# Patient Record
Sex: MALE | Race: WHITE | NOT HISPANIC OR LATINO | Employment: UNEMPLOYED | URBAN - NONMETROPOLITAN AREA
[De-identification: names, ages, dates, MRNs, and addresses within clinical notes are randomized per-mention and may not be internally consistent; named-entity substitution may affect disease eponyms.]

---

## 2017-05-01 ENCOUNTER — OFFICE VISIT (OUTPATIENT)
Dept: CARDIOLOGY | Facility: CLINIC | Age: 62
End: 2017-05-01

## 2017-05-01 VITALS
BODY MASS INDEX: 17.14 KG/M2 | OXYGEN SATURATION: 95 % | HEART RATE: 98 BPM | HEIGHT: 64 IN | SYSTOLIC BLOOD PRESSURE: 124 MMHG | WEIGHT: 100.4 LBS | DIASTOLIC BLOOD PRESSURE: 74 MMHG

## 2017-05-01 DIAGNOSIS — R55 VASOVAGAL SYNCOPE: Primary | ICD-10-CM

## 2017-05-01 DIAGNOSIS — R00.2 PALPITATIONS: ICD-10-CM

## 2017-05-01 DIAGNOSIS — R07.2 PRECORDIAL PAIN: ICD-10-CM

## 2017-05-01 PROBLEM — M13.0 HYPERTROPHIC POLYARTHRITIS: Status: ACTIVE | Noted: 2017-05-01

## 2017-05-01 PROBLEM — K21.9 ACID REFLUX: Status: ACTIVE | Noted: 2017-05-01

## 2017-05-01 PROCEDURE — 99203 OFFICE O/P NEW LOW 30 MIN: CPT | Performed by: PHYSICIAN ASSISTANT

## 2017-05-01 RX ORDER — BENZONATATE 100 MG/1
CAPSULE ORAL
Refills: 0 | COMMUNITY
Start: 2017-04-25

## 2017-05-01 RX ORDER — ALBUTEROL SULFATE 90 UG/1
AEROSOL, METERED RESPIRATORY (INHALATION)
Refills: 2 | COMMUNITY
Start: 2017-03-09

## 2017-05-01 RX ORDER — MONTELUKAST SODIUM 10 MG/1
TABLET ORAL NIGHTLY
Refills: 2 | COMMUNITY
Start: 2017-03-09

## 2017-05-01 RX ORDER — OMEPRAZOLE 20 MG/1
20 CAPSULE, DELAYED RELEASE ORAL 2 TIMES DAILY
COMMUNITY
End: 2018-05-07 | Stop reason: SDUPTHER

## 2017-05-01 RX ORDER — GABAPENTIN 300 MG/1
CAPSULE ORAL 3 TIMES DAILY
Refills: 2 | COMMUNITY
Start: 2017-03-09

## 2017-05-01 RX ORDER — CETIRIZINE HYDROCHLORIDE 10 MG/1
TABLET ORAL DAILY
Refills: 2 | COMMUNITY
Start: 2017-03-09

## 2017-05-01 RX ORDER — HYDROCHLOROTHIAZIDE 25 MG/1
TABLET ORAL DAILY
Refills: 2 | COMMUNITY
Start: 2017-03-09

## 2017-05-01 RX ORDER — HYDROXYZINE HYDROCHLORIDE 25 MG/1
TABLET, FILM COATED ORAL DAILY
Refills: 0 | COMMUNITY
Start: 2017-04-25

## 2017-05-01 RX ORDER — LOSARTAN POTASSIUM 100 MG/1
TABLET ORAL DAILY
Refills: 2 | COMMUNITY
Start: 2017-03-09

## 2017-05-01 RX ORDER — AMLODIPINE BESYLATE 10 MG/1
TABLET ORAL DAILY
Refills: 2 | COMMUNITY
Start: 2017-03-09

## 2017-05-01 RX ORDER — RANITIDINE 150 MG/1
TABLET ORAL DAILY
Refills: 0 | Status: ON HOLD | COMMUNITY
Start: 2017-04-25 | End: 2018-04-13 | Stop reason: ALTCHOICE

## 2017-05-01 RX ORDER — ALBUTEROL SULFATE 2.5 MG/3ML
SOLUTION RESPIRATORY (INHALATION)
Refills: 0 | COMMUNITY
Start: 2017-04-25

## 2017-05-01 RX ORDER — ACETAMINOPHEN 500 MG/1
TABLET, FILM COATED ORAL
Refills: 0 | COMMUNITY
Start: 2017-03-28

## 2017-05-01 RX ORDER — POTASSIUM CHLORIDE 20 MEQ/1
TABLET, EXTENDED RELEASE ORAL
Refills: 0 | COMMUNITY
Start: 2017-03-09

## 2017-05-01 RX ORDER — FLUTICASONE PROPIONATE 50 MCG
SPRAY, SUSPENSION (ML) NASAL DAILY
Refills: 2 | COMMUNITY
Start: 2017-03-09

## 2017-05-01 RX ORDER — MIRTAZAPINE 15 MG/1
TABLET, FILM COATED ORAL NIGHTLY
Refills: 2 | COMMUNITY
Start: 2017-03-09

## 2017-05-01 RX ORDER — LINACLOTIDE 290 UG/1
CAPSULE, GELATIN COATED ORAL DAILY
Refills: 0 | COMMUNITY
Start: 2017-04-25

## 2017-05-01 RX ORDER — DM/PE/ACETAMINOPHEN/CHLORPHENR 10-5-325-2
TABLET, SEQUENTIAL ORAL DAILY
Refills: 2 | COMMUNITY
Start: 2017-03-09

## 2017-05-01 RX ORDER — CLONIDINE HYDROCHLORIDE 0.3 MG/1
TABLET ORAL DAILY
Refills: 0 | COMMUNITY
Start: 2017-04-25

## 2017-05-01 RX ORDER — METOPROLOL SUCCINATE 50 MG/1
TABLET, EXTENDED RELEASE ORAL DAILY
Refills: 2 | COMMUNITY
Start: 2017-03-09

## 2017-05-01 RX ORDER — MECLIZINE HYDROCHLORIDE 25 MG/1
TABLET ORAL 3 TIMES DAILY
Refills: 2 | COMMUNITY
Start: 2017-03-09

## 2017-05-01 RX ORDER — NAPROXEN 500 MG/1
TABLET ORAL
Refills: 2 | COMMUNITY
Start: 2017-03-09

## 2017-05-01 RX ORDER — BUSPIRONE HYDROCHLORIDE 5 MG/1
TABLET ORAL 3 TIMES DAILY
Refills: 2 | COMMUNITY
Start: 2017-03-09

## 2017-05-01 RX ORDER — DILTIAZEM HYDROCHLORIDE 60 MG/1
2 TABLET, FILM COATED ORAL 2 TIMES DAILY
Refills: 2 | COMMUNITY
Start: 2017-03-09

## 2018-03-09 ENCOUNTER — OFFICE VISIT (OUTPATIENT)
Dept: GASTROENTEROLOGY | Facility: CLINIC | Age: 63
End: 2018-03-09

## 2018-03-09 VITALS
WEIGHT: 107.4 LBS | HEIGHT: 64 IN | OXYGEN SATURATION: 98 % | BODY MASS INDEX: 18.34 KG/M2 | HEART RATE: 97 BPM | SYSTOLIC BLOOD PRESSURE: 185 MMHG | DIASTOLIC BLOOD PRESSURE: 102 MMHG

## 2018-03-09 DIAGNOSIS — Z86.010 HISTORY OF COLON POLYPS: ICD-10-CM

## 2018-03-09 DIAGNOSIS — R11.2 NAUSEA AND VOMITING, INTRACTABILITY OF VOMITING NOT SPECIFIED, UNSPECIFIED VOMITING TYPE: ICD-10-CM

## 2018-03-09 DIAGNOSIS — R10.84 GENERALIZED ABDOMINAL PAIN: Primary | ICD-10-CM

## 2018-03-09 DIAGNOSIS — K59.00 CONSTIPATION, UNSPECIFIED CONSTIPATION TYPE: ICD-10-CM

## 2018-03-09 PROCEDURE — 99244 OFF/OP CNSLTJ NEW/EST MOD 40: CPT | Performed by: PHYSICIAN ASSISTANT

## 2018-03-09 RX ORDER — AMITRIPTYLINE HYDROCHLORIDE 10 MG/1
10 TABLET, FILM COATED ORAL NIGHTLY
Status: ON HOLD | COMMUNITY
End: 2018-04-13 | Stop reason: ALTCHOICE

## 2018-03-09 RX ORDER — SUCRALFATE 1 G/1
1 TABLET ORAL 4 TIMES DAILY
COMMUNITY
End: 2018-05-07

## 2018-03-09 RX ORDER — CYPROHEPTADINE HYDROCHLORIDE 4 MG/1
4 TABLET ORAL 3 TIMES DAILY PRN
COMMUNITY

## 2018-03-09 NOTE — PROGRESS NOTES
Chief Complaint   Patient presents with   • Nausea   • Vomiting   • Bloated   • Abdominal Pain     Terell Rojas is a 62 y.o. male who presents to the office today at the request of JEFFY Everett for Nausea; Vomiting; Bloated; and Abdominal Pain.    HPI    The patient was seen for a GI evaluation due to nausea, vomiting, abdominal bloating and pain that has been occurring for the past year.  Patient has come to the point of not eating much.  He has chronic constipation and takes Linzess 290 mcg daily but it does not alleviate his constipation.  He had an EGD/colonoscopy 2-3 years ago by Dr. Rodrigues.  Polyps were removed, but the entire colon could not be visualized due to inadequate bowel prep.  His weight waxes and wanes.  Patient takes daily Neurontin.  He explains that he was told ten years ago by Dr. Vega that he has cirrhosis of the liver but he is unsure.  Patient reports that he drinks beer almost daily.  Medical, surgical, social and family histories were reviewed and are listed below.      Review of Systems   Constitutional: Positive for appetite change and fatigue. Negative for chills and fever.   HENT: Negative for trouble swallowing.    Eyes: Negative for visual disturbance.   Respiratory: Positive for shortness of breath.    Cardiovascular: Positive for chest pain.   Gastrointestinal: Positive for abdominal distention, abdominal pain, constipation, nausea and vomiting. Negative for anal bleeding, blood in stool, diarrhea and rectal pain.   Genitourinary: Negative for difficulty urinating.   Neurological: Positive for dizziness, syncope, light-headedness and headaches.   Hematological: Bruises/bleeds easily.   Psychiatric/Behavioral: Positive for sleep disturbance. The patient is nervous/anxious.        ACTIVE PROBLEMS:   Specialty Problems        Gastroenterology Problems    Acid reflux              PAST MEDICAL HISTORY:  Past Medical History:   Diagnosis Date   • COPD (chronic obstructive  pulmonary disease) 11/15/2016   • Hypertension 11/15/2016   • Narrow complex tachycardia 11/15/2016    Likely SVT   • Syncope    • Tobacco use     Chronic       SURGICAL HISTORY:  Past Surgical History:   Procedure Laterality Date   • CARDIAC CATHETERIZATION  2008    Normal coronaries   • ENDOSCOPY AND COLONOSCOPY     • TOTAL HIP ARTHROPLASTY REVISION         FAMILY HISTORY:  Family History   Problem Relation Age of Onset   • Coronary artery disease Other    • Diabetes Other    • Heart disease Other    • Hypertension Other    • Liver cancer Mother        SOCIAL HISTORY:  Social History   Substance Use Topics   • Smoking status: Current Every Day Smoker     Types: Cigarettes   • Smokeless tobacco: Never Used   • Alcohol use Yes      Comment: beer       CURRENT MEDICATION:    Current Outpatient Prescriptions:   •  amitriptyline (ELAVIL) 10 MG tablet, Take 10 mg by mouth Every Night., Disp: , Rfl:   •  amLODIPine (NORVASC) 10 MG tablet, Daily., Disp: , Rfl: 2  •  benzonatate (TESSALON) 100 MG capsule, prn, Disp: , Rfl: 0  •  busPIRone (BUSPAR) 5 MG tablet, 3 (Three) Times a Day., Disp: , Rfl: 2  •  cetirizine (zyrTEC) 10 MG tablet, Daily., Disp: , Rfl: 2  •  CloNIDine (CATAPRES) 0.3 MG tablet, Daily., Disp: , Rfl: 0  •  cyproheptadine (PERIACTIN) 4 MG tablet, Take 4 mg by mouth 3 (Three) Times a Day As Needed for Allergies., Disp: , Rfl:   •  fluticasone (FLONASE) 50 MCG/ACT nasal spray, Daily., Disp: , Rfl: 2  •  gabapentin (NEURONTIN) 300 MG capsule, 3 (Three) Times a Day., Disp: , Rfl: 2  •  GNP VITAMIN D3 EXTRA STRENGTH 1000 UNITS tablet, Daily., Disp: , Rfl: 2  •  hydrochlorothiazide (HYDRODIURIL) 25 MG tablet, Daily., Disp: , Rfl: 2  •  LINZESS 290 MCG capsule capsule, Daily., Disp: , Rfl: 0  •  losartan (COZAAR) 100 MG tablet, Daily., Disp: , Rfl: 2  •  meclizine (ANTIVERT) 25 MG tablet, 3 (Three) Times a Day., Disp: , Rfl: 2  •  metoprolol succinate XL (TOPROL-XL) 50 MG 24 hr tablet, Daily., Disp: , Rfl: 2  •   "mirtazapine (REMERON) 15 MG tablet, Every Night., Disp: , Rfl: 2  •  montelukast (SINGULAIR) 10 MG tablet, Every Night., Disp: , Rfl: 2  •  naproxen (NAPROSYN) 500 MG tablet, prn, Disp: , Rfl: 2  •  omeprazole (priLOSEC) 20 MG capsule, Take 20 mg by mouth 2 (Two) Times a Day., Disp: , Rfl:   •  raNITIdine (ZANTAC) 150 MG tablet, Daily., Disp: , Rfl: 0  •  sucralfate (CARAFATE) 1 g tablet, Take 1 g by mouth 4 (Four) Times a Day., Disp: , Rfl:   •  VENTOLIN  (90 BASE) MCG/ACT inhaler, prn, Disp: , Rfl: 2  •  albuterol (PROVENTIL) (2.5 MG/3ML) 0.083% nebulizer solution, prn, Disp: , Rfl: 0  •  hydrOXYzine (ATARAX) 25 MG tablet, Daily., Disp: , Rfl: 0  •  PAIN RELIEF EXTRA STRENGTH 500 MG tablet, prn, Disp: , Rfl: 0  •  polyethylene glycol (GoLYTELY) 236 g solution, Starting at 6 pm on day prior to procedure, drink 8 ounces every 15 minutes until all gone or stools are clear. May add flavor packet., Disp: 4000 mL, Rfl: 0  •  potassium chloride (K-DUR,KLOR-CON) 20 MEQ CR tablet, Takes 1 daily, d/c after james., Disp: , Rfl: 0  •  SYMBICORT 80-4.5 MCG/ACT inhaler, 2 puffs 2 (Two) Times a Day., Disp: , Rfl: 2    ALLERGIES:  Review of patient's allergies indicates no known allergies.    VISIT VITALS:  BP (!) 185/102 (BP Location: Right arm, Patient Position: Sitting, Cuff Size: Adult) Comment: manual cuff  Pulse 97  Ht 162.6 cm (64\")  Wt 48.7 kg (107 lb 6.4 oz)  SpO2 98%  BMI 18.44 kg/m2    PHYSICAL EXAMINATION:  Physical Exam   Constitutional: He is oriented to person, place, and time. He appears well-developed and well-nourished. No distress.   HENT:   Head: Normocephalic and atraumatic.   Right Ear: External ear normal.   Left Ear: External ear normal.   Nose: Nose normal.   Mouth/Throat: Oropharynx is clear and moist. No oropharyngeal exudate.   Eyes: Conjunctivae and EOM are normal. Pupils are equal, round, and reactive to light. Right eye exhibits no discharge. Left eye exhibits no discharge. No scleral " icterus.   Neck: Normal range of motion. Neck supple. No JVD present. No tracheal deviation present. No thyromegaly present.   Cardiovascular: Normal rate, regular rhythm, normal heart sounds and intact distal pulses.  Exam reveals no gallop and no friction rub.    No murmur heard.  Pulmonary/Chest: Effort normal and breath sounds normal. No stridor. No respiratory distress. He has no wheezes. He has no rales. He exhibits no tenderness.   Abdominal: Soft. Bowel sounds are normal. He exhibits distension. He exhibits no mass. There is tenderness (diffuse). There is no rebound and no guarding. No hernia.   Musculoskeletal: He exhibits no edema, tenderness or deformity.   Lymphadenopathy:     He has no cervical adenopathy.   Neurological: He is alert and oriented to person, place, and time. He has normal reflexes. He displays normal reflexes. No cranial nerve deficit. He exhibits normal muscle tone. Coordination normal.   Skin: Skin is warm and dry. No rash noted. He is not diaphoretic. No erythema. No pallor.   Psychiatric: He has a normal mood and affect. His behavior is normal. Judgment and thought content normal.       Assessment/Plan      Diagnosis Plan   1. Generalized abdominal pain  CT Abdomen Pelvis With & Without Contrast    Case Request   2. Nausea and vomiting, intractability of vomiting not specified, unspecified vomiting type  CT Abdomen Pelvis With & Without Contrast    Case Request   3. Constipation, unspecified constipation type  CT Abdomen Pelvis With & Without Contrast    Case Request   4. History of colon polyps  Case Request     The patient will be scheduled for an EGD/colonoscopy and a CT scan of his abdomen and pelvis.  Patient's abdominal tenderness is significant.  He will be given samples of Relistor 450mg to take daily in place of Linzess 290 mcg to see if his constipation can be alleviated.  His blood pressure was elevated today.  He states that his bottom number is always elevated and he  takes his medication daily.  I called and left a message with the office statff at [facility] regarding my concerns about his uncontrolled BP.  Patient voiced understanding and agreement of recommendations.   Return in about 8 weeks (around 5/4/2018) for Recheck.      I advised the patient of the risks in continuing to use tobacco, and I provided this patient with smoking cessation educational materials.    During this visit, I spent < 3 minutes counseling the patient regarding smoking cessation.        JAMIL Moulton

## 2018-03-28 ENCOUNTER — HOSPITAL ENCOUNTER (OUTPATIENT)
Dept: CT IMAGING | Facility: HOSPITAL | Age: 63
Discharge: HOME | End: 2018-03-28
Admitting: PHYSICIAN ASSISTANT

## 2018-03-28 DIAGNOSIS — R10.84 GENERALIZED ABDOMINAL PAIN: ICD-10-CM

## 2018-03-28 DIAGNOSIS — K59.00 CONSTIPATION, UNSPECIFIED CONSTIPATION TYPE: ICD-10-CM

## 2018-03-28 DIAGNOSIS — R11.2 NAUSEA AND VOMITING, INTRACTABILITY OF VOMITING NOT SPECIFIED, UNSPECIFIED VOMITING TYPE: ICD-10-CM

## 2018-03-28 LAB — CREAT BLDA-MCNC: 0.7 MG/DL (ref 0.6–1.3)

## 2018-03-28 PROCEDURE — 82565 ASSAY OF CREATININE: CPT

## 2018-03-28 PROCEDURE — 25010000002 IOPAMIDOL 61 % SOLUTION: Performed by: PHYSICIAN ASSISTANT

## 2018-03-28 PROCEDURE — 74178 CT ABD&PLV WO CNTR FLWD CNTR: CPT | Performed by: RADIOLOGY

## 2018-03-28 PROCEDURE — 74178 CT ABD&PLV WO CNTR FLWD CNTR: CPT

## 2018-03-28 RX ADMIN — IOPAMIDOL 100 ML: 612 INJECTION, SOLUTION INTRAVENOUS at 10:45

## 2018-03-29 ENCOUNTER — TELEPHONE (OUTPATIENT)
Dept: GASTROENTEROLOGY | Facility: CLINIC | Age: 63
End: 2018-03-29

## 2018-04-13 ENCOUNTER — HOSPITAL ENCOUNTER (EMERGENCY)
Facility: HOSPITAL | Age: 63
Discharge: HOME | End: 2018-04-13
Attending: EMERGENCY MEDICINE | Admitting: EMERGENCY MEDICINE

## 2018-04-13 ENCOUNTER — ANESTHESIA EVENT (OUTPATIENT)
Dept: PERIOP | Facility: HOSPITAL | Age: 63
End: 2018-04-13

## 2018-04-13 ENCOUNTER — ANESTHESIA (OUTPATIENT)
Dept: PERIOP | Facility: HOSPITAL | Age: 63
End: 2018-04-13

## 2018-04-13 ENCOUNTER — HOSPITAL ENCOUNTER (OUTPATIENT)
Facility: HOSPITAL | Age: 63
Setting detail: HOSPITAL OUTPATIENT SURGERY
Discharge: HOME | End: 2018-04-13
Attending: INTERNAL MEDICINE | Admitting: INTERNAL MEDICINE

## 2018-04-13 VITALS
HEART RATE: 98 BPM | WEIGHT: 105 LBS | RESPIRATION RATE: 20 BRPM | TEMPERATURE: 98.6 F | OXYGEN SATURATION: 95 % | HEIGHT: 64 IN | SYSTOLIC BLOOD PRESSURE: 100 MMHG | BODY MASS INDEX: 17.93 KG/M2 | DIASTOLIC BLOOD PRESSURE: 58 MMHG

## 2018-04-13 VITALS
DIASTOLIC BLOOD PRESSURE: 91 MMHG | RESPIRATION RATE: 20 BRPM | HEART RATE: 89 BPM | HEIGHT: 63 IN | SYSTOLIC BLOOD PRESSURE: 147 MMHG | WEIGHT: 104 LBS | BODY MASS INDEX: 18.43 KG/M2 | OXYGEN SATURATION: 97 % | TEMPERATURE: 98.2 F

## 2018-04-13 DIAGNOSIS — K62.5 RECTAL BLEEDING: Primary | ICD-10-CM

## 2018-04-13 DIAGNOSIS — R10.84 GENERALIZED ABDOMINAL PAIN: ICD-10-CM

## 2018-04-13 DIAGNOSIS — Z86.010 HISTORY OF COLONOSCOPY WITH POLYPECTOMY: ICD-10-CM

## 2018-04-13 DIAGNOSIS — Z86.010 HISTORY OF COLON POLYPS: ICD-10-CM

## 2018-04-13 DIAGNOSIS — K59.00 CONSTIPATION, UNSPECIFIED CONSTIPATION TYPE: ICD-10-CM

## 2018-04-13 DIAGNOSIS — Z98.890 HISTORY OF COLONOSCOPY WITH POLYPECTOMY: ICD-10-CM

## 2018-04-13 DIAGNOSIS — R11.2 NAUSEA AND VOMITING, INTRACTABILITY OF VOMITING NOT SPECIFIED, UNSPECIFIED VOMITING TYPE: ICD-10-CM

## 2018-04-13 LAB
ANION GAP SERPL CALCULATED.3IONS-SCNC: 10.7 MMOL/L (ref 3.6–11.2)
BASOPHILS # BLD AUTO: 0.07 10*3/MM3 (ref 0–0.3)
BASOPHILS NFR BLD AUTO: 0.7 % (ref 0–2)
BUN BLD-MCNC: 9 MG/DL (ref 7–21)
BUN/CREAT SERPL: 13.2 (ref 7–25)
CALCIUM SPEC-SCNC: 9.2 MG/DL (ref 7.7–10)
CHLORIDE SERPL-SCNC: 102 MMOL/L (ref 99–112)
CO2 SERPL-SCNC: 23.3 MMOL/L (ref 24.3–31.9)
CREAT BLD-MCNC: 0.68 MG/DL (ref 0.43–1.29)
DEPRECATED RDW RBC AUTO: 41.5 FL (ref 37–54)
EOSINOPHIL # BLD AUTO: 0.92 10*3/MM3 (ref 0–0.7)
EOSINOPHIL NFR BLD AUTO: 8.6 % (ref 0–5)
ERYTHROCYTE [DISTWIDTH] IN BLOOD BY AUTOMATED COUNT: 12.9 % (ref 11.5–14.5)
GFR SERPL CREATININE-BSD FRML MDRD: 118 ML/MIN/1.73
GLUCOSE BLD-MCNC: 76 MG/DL (ref 70–110)
HCT VFR BLD AUTO: 34.3 % (ref 42–52)
HGB BLD-MCNC: 11.7 G/DL (ref 14–18)
IMM GRANULOCYTES # BLD: 0.02 10*3/MM3 (ref 0–0.03)
IMM GRANULOCYTES NFR BLD: 0.2 % (ref 0–0.5)
LYMPHOCYTES # BLD AUTO: 1.85 10*3/MM3 (ref 1–3)
LYMPHOCYTES NFR BLD AUTO: 17.3 % (ref 21–51)
MCH RBC QN AUTO: 31.1 PG (ref 27–33)
MCHC RBC AUTO-ENTMCNC: 34.1 G/DL (ref 33–37)
MCV RBC AUTO: 91.2 FL (ref 80–94)
MONOCYTES # BLD AUTO: 1.21 10*3/MM3 (ref 0.1–0.9)
MONOCYTES NFR BLD AUTO: 11.3 % (ref 0–10)
NEUTROPHILS # BLD AUTO: 6.63 10*3/MM3 (ref 1.4–6.5)
NEUTROPHILS NFR BLD AUTO: 61.9 % (ref 30–70)
OSMOLALITY SERPL CALC.SUM OF ELEC: 269.4 MOSM/KG (ref 273–305)
PLATELET # BLD AUTO: 215 10*3/MM3 (ref 130–400)
PMV BLD AUTO: 9.4 FL (ref 6–10)
POTASSIUM BLD-SCNC: 3.4 MMOL/L (ref 3.5–5.3)
RBC # BLD AUTO: 3.76 10*6/MM3 (ref 4.7–6.1)
SODIUM BLD-SCNC: 136 MMOL/L (ref 135–153)
WBC NRBC COR # BLD: 10.7 10*3/MM3 (ref 4.5–12.5)

## 2018-04-13 PROCEDURE — 25010000002 PROPOFOL 1000 MG/ML EMULSION: Performed by: NURSE ANESTHETIST, CERTIFIED REGISTERED

## 2018-04-13 PROCEDURE — 25010000002 FENTANYL CITRATE (PF) 100 MCG/2ML SOLUTION: Performed by: NURSE ANESTHETIST, CERTIFIED REGISTERED

## 2018-04-13 PROCEDURE — 43239 EGD BIOPSY SINGLE/MULTIPLE: CPT | Performed by: INTERNAL MEDICINE

## 2018-04-13 PROCEDURE — 36415 COLL VENOUS BLD VENIPUNCTURE: CPT

## 2018-04-13 PROCEDURE — 99284 EMERGENCY DEPT VISIT MOD MDM: CPT

## 2018-04-13 PROCEDURE — 80048 BASIC METABOLIC PNL TOTAL CA: CPT | Performed by: EMERGENCY MEDICINE

## 2018-04-13 PROCEDURE — 25010000002 PROPOFOL 10 MG/ML EMULSION: Performed by: NURSE ANESTHETIST, CERTIFIED REGISTERED

## 2018-04-13 PROCEDURE — 45385 COLONOSCOPY W/LESION REMOVAL: CPT | Performed by: INTERNAL MEDICINE

## 2018-04-13 PROCEDURE — 85025 COMPLETE CBC W/AUTO DIFF WBC: CPT | Performed by: EMERGENCY MEDICINE

## 2018-04-13 RX ORDER — PROPOFOL 10 MG/ML
VIAL (ML) INTRAVENOUS AS NEEDED
Status: DISCONTINUED | OUTPATIENT
Start: 2018-04-13 | End: 2018-04-13 | Stop reason: SURG

## 2018-04-13 RX ORDER — FENTANYL CITRATE 50 UG/ML
INJECTION, SOLUTION INTRAMUSCULAR; INTRAVENOUS AS NEEDED
Status: DISCONTINUED | OUTPATIENT
Start: 2018-04-13 | End: 2018-04-13 | Stop reason: SURG

## 2018-04-13 RX ORDER — IPRATROPIUM BROMIDE AND ALBUTEROL SULFATE 2.5; .5 MG/3ML; MG/3ML
3 SOLUTION RESPIRATORY (INHALATION) ONCE AS NEEDED
Status: DISCONTINUED | OUTPATIENT
Start: 2018-04-13 | End: 2018-04-13 | Stop reason: HOSPADM

## 2018-04-13 RX ORDER — ONDANSETRON 2 MG/ML
4 INJECTION INTRAMUSCULAR; INTRAVENOUS ONCE AS NEEDED
Status: DISCONTINUED | OUTPATIENT
Start: 2018-04-13 | End: 2018-04-13 | Stop reason: HOSPADM

## 2018-04-13 RX ORDER — SODIUM CHLORIDE, SODIUM LACTATE, POTASSIUM CHLORIDE, CALCIUM CHLORIDE 600; 310; 30; 20 MG/100ML; MG/100ML; MG/100ML; MG/100ML
125 INJECTION, SOLUTION INTRAVENOUS CONTINUOUS
Status: DISCONTINUED | OUTPATIENT
Start: 2018-04-13 | End: 2018-04-13 | Stop reason: HOSPADM

## 2018-04-13 RX ORDER — LIDOCAINE HYDROCHLORIDE 20 MG/ML
INJECTION, SOLUTION INFILTRATION; PERINEURAL AS NEEDED
Status: DISCONTINUED | OUTPATIENT
Start: 2018-04-13 | End: 2018-04-13 | Stop reason: SURG

## 2018-04-13 RX ORDER — SODIUM CHLORIDE 0.9 % (FLUSH) 0.9 %
1-10 SYRINGE (ML) INJECTION AS NEEDED
Status: DISCONTINUED | OUTPATIENT
Start: 2018-04-13 | End: 2018-04-13 | Stop reason: HOSPADM

## 2018-04-13 RX ORDER — FENTANYL CITRATE 50 UG/ML
50 INJECTION, SOLUTION INTRAMUSCULAR; INTRAVENOUS
Status: DISCONTINUED | OUTPATIENT
Start: 2018-04-13 | End: 2018-04-13 | Stop reason: HOSPADM

## 2018-04-13 RX ADMIN — LIDOCAINE HYDROCHLORIDE 100 MG: 20 INJECTION, SOLUTION INFILTRATION; PERINEURAL at 10:26

## 2018-04-13 RX ADMIN — PROPOFOL 150 MCG/KG/MIN: 10 INJECTION, EMULSION INTRAVENOUS at 10:26

## 2018-04-13 RX ADMIN — FENTANYL CITRATE 100 MCG: 50 INJECTION INTRAMUSCULAR; INTRAVENOUS at 10:22

## 2018-04-13 RX ADMIN — PROPOFOL 60 MG: 10 INJECTION, EMULSION INTRAVENOUS at 10:26

## 2018-04-13 RX ADMIN — SODIUM CHLORIDE, POTASSIUM CHLORIDE, SODIUM LACTATE AND CALCIUM CHLORIDE: 600; 310; 30; 20 INJECTION, SOLUTION INTRAVENOUS at 10:23

## 2018-04-13 NOTE — ANESTHESIA POSTPROCEDURE EVALUATION
Patient: Terell Rojas    Procedure Summary     Date:  04/13/18 Room / Location:  [facility] OR [address] [place] [address] COR OR    Anesthesia Start:  1023 Anesthesia Stop:  1058    Procedures:       ESOPHAGOGASTRODUODENOSCOPY WITH BIOPSY  CPTCODE:53072 (N/A Esophagus)      COLONOSCOPY  CPTCODE:93844 (N/A ) Diagnosis:       Generalized abdominal pain      History of colon polyps      Constipation, unspecified constipation type      Nausea and vomiting, intractability of vomiting not specified, unspecified vomiting type      (Generalized abdominal pain [R10.84])      (History of colon polyps [Z86.010])      (Constipation, unspecified constipation type [K59.00])      (Nausea and vomiting, intractability of vomiting not specified, unspecified vomiting type [R11.2])    Surgeon:  Maverick Brady III, MD Provider:  Carlos Rachel MD    Anesthesia Type:  general ASA Status:  3          Anesthesia Type: general  Last vitals  BP   113/72 (04/13/18 1110)   Temp   98.2 °F (36.8 °C) (04/13/18 1100)   Pulse   92 (04/13/18 1110)   Resp   20 (04/13/18 1110)     SpO2   99 % (04/13/18 1110)     Post Anesthesia Care and Evaluation    Patient location during evaluation: PHASE II  Patient participation: complete - patient participated  Level of consciousness: awake and alert  Pain score: 1  Pain management: adequate  Airway patency: patent  Anesthetic complications: No anesthetic complications  PONV Status: controlled  Cardiovascular status: acceptable  Respiratory status: acceptable  Hydration status: acceptable

## 2018-04-13 NOTE — OP NOTE
04/13/18    ESOPHAGOGASTRODUODENOSCOPY WITH BIOPSY, COLONOSCOPY with polypectomy Procedure Note    Terell Rojas  4/13/2018    Dr. Brady      Pre-op Diagnosis: Abdominal pain, nausea/vomiting, constipation, history of colon polyps, colon cancer screening, last colonoscopy was performed about 3 years ago      Post-op Diagnosis:   -Normal EGD  -Sigmoid polyp, removed        Anesthesia: Per Anesthesia service, general anesthesia      Estimated Blood Loss: Negligible      Findings: EGD was performed with careful examination of the esophagus, stomach, duodenum to the fourth portion.  Question of minimal benign Johns's mucosa in the distal esophagus at the EG junction--biopsies were obtained from the GE junction.  Hiatal hernia was not identified and there was no obvious evidence of active reflux esophagitis.  Stomach and duodenum were unremarkable in appearance.  Biopsies were taken from the gastric antrum in order to check for H. pylori.  Duodenal biopsies were taken in order to screen for occult small bowel mucosal disease.    Normal rectal examination.  Colonoscopy was performed to the cecum, confirmed by identification of typical cecal anatomy.  Bowel preparation was fair but adequate.  The colon was quite tortuous and the examination was difficult to perform.  A single benign-appearing sessile polyp (estimated 1 cm in size) was found in the distal sigmoid and this was removed using the cold snare.  No other abnormality was identified.    He tolerated the procedures well and there were no complications.  He left the OR in stable and satisfactory condition.      Specimens: EG junction, gastric antrum, duodenum, sigmoid polyp      Grafts/Implants: N/A      Complications: None      Recommendations:   Findings discussed with patient  Await biopsy findings  Repeat screening/surveillance colonoscopy in 3-5 years    Maverick Brady III, MD     Date: 4/13/2018  Time: 10:59 AM     CC:  Tia BARDALES

## 2018-04-13 NOTE — ANESTHESIA PREPROCEDURE EVALUATION
Anesthesia Evaluation     no history of anesthetic complications:  NPO Solid Status: > 8 hours  NPO Liquid Status: > 8 hours           Airway   Mallampati: II  TM distance: >3 FB  Neck ROM: full  No difficulty expected  Dental    (+) edentulous    Pulmonary - normal exam   (+) a smoker Current Smoked day of surgery, COPD,   Cardiovascular - normal exam    (+) hypertension,       Neuro/Psych  GI/Hepatic/Renal/Endo    (+)  GERD,      Musculoskeletal     Abdominal  - normal exam   Substance History      OB/GYN          Other                        Anesthesia Plan    ASA 3     general     intravenous induction   Anesthetic plan and risks discussed with patient.

## 2018-04-13 NOTE — H&P
History of presenting illness: 63-year-old white male presents for EGD and colonoscopy in order to investigate to recurrent to generalized abdominal pain, nausea/vomiting, constipation.  He has history of colonic polyps.  Colonoscopy was last performed about 3 years ago.    Review of Systems:   Negative and noncontributory    Past History:  Past Medical History:   Diagnosis Date   • COPD (chronic obstructive pulmonary disease) 11/15/2016   • Hypertension 11/15/2016   • Narrow complex tachycardia 11/15/2016    Likely SVT   • Syncope    • Tobacco use     Chronic     Past Surgical History:   Procedure Laterality Date   • CARDIAC CATHETERIZATION  2008    Normal coronaries   • ENDOSCOPY AND COLONOSCOPY     • TOTAL HIP ARTHROPLASTY REVISION       Family History   Problem Relation Age of Onset   • Coronary artery disease Other    • Diabetes Other    • Heart disease Other    • Hypertension Other    • Liver cancer Mother      Social History     Social History   • Marital status: Single     Social History Main Topics   • Smoking status: Current Every Day Smoker     Types: Cigarettes   • Smokeless tobacco: Never Used   • Alcohol use Yes      Comment: beer   • Drug use: No     Other Topics Concern   • Not on file     Prior to Admission medications    Medication Sig Start Date End Date Taking? Authorizing Provider   albuterol (PROVENTIL) (2.5 MG/3ML) 0.083% nebulizer solution prn 4/25/17   Historical Provider, MD   amitriptyline (ELAVIL) 10 MG tablet Take 10 mg by mouth Every Night.    Historical Provider, MD   amLODIPine (NORVASC) 10 MG tablet Daily. 3/9/17   Historical Provider, MD   benzonatate (TESSALON) 100 MG capsule prn 4/25/17   Historical Provider, MD   busPIRone (BUSPAR) 5 MG tablet 3 (Three) Times a Day. 3/9/17   Historical Provider, MD   cetirizine (zyrTEC) 10 MG tablet Daily. 3/9/17   Historical Provider, MD   CloNIDine (CATAPRES) 0.3 MG tablet Daily. 4/25/17   Historical Provider, MD   cyproheptadine (PERIACTIN) 4  MG tablet Take 4 mg by mouth 3 (Three) Times a Day As Needed for Allergies.    Historical Provider, MD   fluticasone (FLONASE) 50 MCG/ACT nasal spray Daily. 3/9/17   Historical Provider, MD   gabapentin (NEURONTIN) 300 MG capsule 3 (Three) Times a Day. 3/9/17   Historical Provider, MD   GNP VITAMIN D3 EXTRA STRENGTH 1000 UNITS tablet Daily. 3/9/17   Historical Provider, MD   hydrochlorothiazide (HYDRODIURIL) 25 MG tablet Daily. 3/9/17   Historical Provider, MD   hydrOXYzine (ATARAX) 25 MG tablet Daily. 4/25/17   Historical Provider, MD   LINZESS 290 MCG capsule capsule Daily. 4/25/17   Historical Provider, MD   losartan (COZAAR) 100 MG tablet Daily. 3/9/17   Historical Provider, MD   meclizine (ANTIVERT) 25 MG tablet 3 (Three) Times a Day. 3/9/17   Historical Provider, MD   metoprolol succinate XL (TOPROL-XL) 50 MG 24 hr tablet Daily. 3/9/17   Historical Provider, MD   mirtazapine (REMERON) 15 MG tablet Every Night. 3/9/17   Historical Provider, MD   montelukast (SINGULAIR) 10 MG tablet Every Night. 3/9/17   Historical Provider, MD   naproxen (NAPROSYN) 500 MG tablet prn 3/9/17   Historical Provider, MD   omeprazole (priLOSEC) 20 MG capsule Take 20 mg by mouth 2 (Two) Times a Day.    Historical Provider, MD   PAIN RELIEF EXTRA STRENGTH 500 MG tablet prn 3/28/17   Historical Provider, MD   polyethylene glycol (GoLYTELY) 236 g solution Starting at 6 pm on day prior to procedure, drink 8 ounces every 15 minutes until all gone or stools are clear. May add flavor packet. 3/9/18   JAMIL Moulton   potassium chloride (K-DUR,KLOR-CON) 20 MEQ CR tablet Takes 1 daily, d/c after james. 3/9/17   Historical Provider, MD   raNITIdine (ZANTAC) 150 MG tablet Daily. 4/25/17   Historical Provider, MD   sucralfate (CARAFATE) 1 g tablet Take 1 g by mouth 4 (Four) Times a Day.    Historical Provider, MD   SYMBICORT 80-4.5 MCG/ACT inhaler 2 puffs 2 (Two) Times a Day. 3/9/17   Historical Provider, MD   VENTOLIN  90  BASE) MCG/ACT inhaler prn 3/9/17   Historical Provider, MD       Current medication:  I have reviewed the list of current medications.    Allergies:   Review of patient's allergies indicates no known allergies.    Vital Signs       Physical exam:  Alert, oriented ×3  HEENT: Normal  Neck: No mass  Chest: Clear  Heart: Regular rhythm  Abdomen: Soft, nontender, active BS  Extremities: No edema  Neuro: No focal deficit    Assessment/Plan:   Generalized abdominal pain  Nausea/vomiting  Constipation  History of colon polyps  Colon cancer screening    REC  EGD and screening/surveillance colonoscopy are planned.  Procedures, benefits, risks and alternatives have been discussed with the patient.      Maverick Brady III, MD  04/13/18  8:57 AM

## 2018-04-13 NOTE — ED PROVIDER NOTES
Subjective   63-year-old white male here today for rectal bleeding.  Patient had EGD and colonoscopy earlier today done by Dr. Brady.  He had gastric biopsies and sigmoid polypectomy.  After the procedure, the patient went home and had one episode of bright red blood per rectum.  He has not had any further episodes since that time.  He denies any abdominal pain, chest pain, shortness of breath, dizziness, or other complaints.            Review of Systems   All other systems reviewed and are negative.      Past Medical History:   Diagnosis Date   • COPD (chronic obstructive pulmonary disease) 11/15/2016   • Hypertension 11/15/2016   • Narrow complex tachycardia 11/15/2016    Likely SVT   • Syncope    • Tobacco use     Chronic       No Known Allergies    Past Surgical History:   Procedure Laterality Date   • CARDIAC CATHETERIZATION  2008    Normal coronaries   • ENDOSCOPY AND COLONOSCOPY     • TOTAL HIP ARTHROPLASTY REVISION         Family History   Problem Relation Age of Onset   • Coronary artery disease Other    • Diabetes Other    • Heart disease Other    • Hypertension Other    • Liver cancer Mother        Social History     Social History   • Marital status: Single     Social History Main Topics   • Smoking status: Current Every Day Smoker     Packs/day: 3.00     Years: 40.00     Types: Cigarettes   • Smokeless tobacco: Never Used   • Alcohol use 3.6 oz/week     6 Cans of beer per week      Comment: states, sometimes 6 pk per day   • Drug use: No   • Sexual activity: Defer     Other Topics Concern   • Not on file           Objective   Physical Exam   Constitutional: He is oriented to person, place, and time. He appears well-developed and well-nourished.   HENT:   Head: Normocephalic and atraumatic.   Cardiovascular: Normal rate, regular rhythm and normal heart sounds.  Exam reveals no gallop and no friction rub.    No murmur heard.  Pulmonary/Chest: Effort normal and breath sounds normal. No respiratory  distress. He has no rales.   Abdominal: Soft. Bowel sounds are normal. He exhibits no distension. There is no tenderness.   Genitourinary: Rectal exam shows guaiac positive stool. Rectal exam shows no mass and no tenderness. Prostate is enlarged.   Genitourinary Comments: On rectal exam, small amount of gross rectal blood noted which was heme-positive.   Musculoskeletal: Normal range of motion. He exhibits no edema.   Neurological: He is alert and oriented to person, place, and time.   Skin: Skin is warm and dry.   Psychiatric: He has a normal mood and affect.   Nursing note and vitals reviewed.      Procedures  Results for orders placed or performed during the hospital encounter of 04/13/18   Basic Metabolic Panel   Result Value Ref Range    Glucose 76 70 - 110 mg/dL    BUN 9 7 - 21 mg/dL    Creatinine 0.68 0.43 - 1.29 mg/dL    Sodium 136 135 - 153 mmol/L    Potassium 3.4 (L) 3.5 - 5.3 mmol/L    Chloride 102 99 - 112 mmol/L    CO2 23.3 (L) 24.3 - 31.9 mmol/L    Calcium 9.2 7.7 - 10.0 mg/dL    eGFR Non African Amer 118 >60 mL/min/1.73    BUN/Creatinine Ratio 13.2 7.0 - 25.0    Anion Gap 10.7 3.6 - 11.2 mmol/L   CBC Auto Differential   Result Value Ref Range    WBC 10.70 4.50 - 12.50 10*3/mm3    RBC 3.76 (L) 4.70 - 6.10 10*6/mm3    Hemoglobin 11.7 (L) 14.0 - 18.0 g/dL    Hematocrit 34.3 (L) 42.0 - 52.0 %    MCV 91.2 80.0 - 94.0 fL    MCH 31.1 27.0 - 33.0 pg    MCHC 34.1 33.0 - 37.0 g/dL    RDW 12.9 11.5 - 14.5 %    RDW-SD 41.5 37.0 - 54.0 fl    MPV 9.4 6.0 - 10.0 fL    Platelets 215 130 - 400 10*3/mm3    Neutrophil % 61.9 30.0 - 70.0 %    Lymphocyte % 17.3 (L) 21.0 - 51.0 %    Monocyte % 11.3 (H) 0.0 - 10.0 %    Eosinophil % 8.6 (H) 0.0 - 5.0 %    Basophil % 0.7 0.0 - 2.0 %    Immature Grans % 0.2 0.0 - 0.5 %    Neutrophils, Absolute 6.63 (H) 1.40 - 6.50 10*3/mm3    Lymphocytes, Absolute 1.85 1.00 - 3.00 10*3/mm3    Monocytes, Absolute 1.21 (H) 0.10 - 0.90 10*3/mm3    Eosinophils, Absolute 0.92 (H) 0.00 - 0.70  10*3/mm3    Basophils, Absolute 0.07 0.00 - 0.30 10*3/mm3    Immature Grans, Absolute 0.02 0.00 - 0.03 10*3/mm3   Osmolality, Calculated   Result Value Ref Range    Osmolality Calc 269.4 (L) 273.0 - 305.0 mOsm/kg     ED Course  ED Course   Comment By Time   Hemodynamically stable.  H&H 11.7/34.3.  No old values to compare.  He's not had any further episodes of bleeding.  This earlier episode is consistent with having had polypectomy.  Patient seemed to have poor insight into the indications for and consequences of his colonoscopy.  Have provided education.  Will discharge home. Massimo Kimball MD 04/13 1850                  MDM  Number of Diagnoses or Management Options  History of colonoscopy with polypectomy:   Rectal bleeding:   Risk of Complications, Morbidity, and/or Mortality  Presenting problems: high  Diagnostic procedures: moderate  Management options: moderate        Final diagnoses:   Rectal bleeding   History of colonoscopy with polypectomy            Massimo Kimball MD  04/13/18 1854       Massimo Kimball MD  04/13/18 1854       Massimo Kimball MD  04/13/18 1902

## 2018-04-17 LAB
LAB AP CASE REPORT: NORMAL
Lab: NORMAL
PATH REPORT.FINAL DX SPEC: NORMAL

## 2018-04-17 NOTE — TELEPHONE ENCOUNTER
Yes, I have discussed the case with Dr. Brady who agrees that he should see a pulmonologist.  Thank you.

## 2018-04-18 NOTE — TELEPHONE ENCOUNTER
"I called and spoke with patient, who insisted I speak with him, and let him know the recommendation to go to \"lung doctor\". Patient stated he was not interested in doing that. I told him again that they saw a nodule on his lung in CT scan. I explained that our providers were aware of his history of TB, but still wanted him to go to lung doctor. I ask patient if he wanted me to speak with daughter Marylin and he said no. He stated he will call us back if he wishes to have us schedule him an appointment with lung doctor.  "

## 2018-05-07 ENCOUNTER — OFFICE VISIT (OUTPATIENT)
Dept: GASTROENTEROLOGY | Facility: CLINIC | Age: 63
End: 2018-05-07

## 2018-05-07 VITALS
OXYGEN SATURATION: 97 % | HEIGHT: 64 IN | SYSTOLIC BLOOD PRESSURE: 130 MMHG | BODY MASS INDEX: 17.58 KG/M2 | HEART RATE: 94 BPM | DIASTOLIC BLOOD PRESSURE: 79 MMHG | WEIGHT: 103 LBS

## 2018-05-07 DIAGNOSIS — K21.00 GASTROESOPHAGEAL REFLUX DISEASE WITH ESOPHAGITIS: Primary | ICD-10-CM

## 2018-05-07 DIAGNOSIS — K59.00 CONSTIPATION, UNSPECIFIED CONSTIPATION TYPE: ICD-10-CM

## 2018-05-07 PROCEDURE — 99214 OFFICE O/P EST MOD 30 MIN: CPT | Performed by: PHYSICIAN ASSISTANT

## 2018-05-07 RX ORDER — OMEPRAZOLE 40 MG/1
40 CAPSULE, DELAYED RELEASE ORAL
Qty: 60 CAPSULE | Refills: 5 | Status: SHIPPED | OUTPATIENT
Start: 2018-05-07

## 2018-05-07 RX ORDER — POLYETHYLENE GLYCOL 3350 17 G/17G
17 POWDER, FOR SOLUTION ORAL DAILY
Qty: 510 G | Refills: 2 | Status: SHIPPED | OUTPATIENT
Start: 2018-05-07

## 2018-05-07 NOTE — PATIENT INSTRUCTIONS
Fiber Foods  It is recommended that you consume 25-45 grams daily.  Drink 4-5 bottles of water daily.     Fresh & Dried Fruit  Serving Size Fiber (g)    Apples with skin  1 medium 5.0    Apricot  3 medium 1.0    Apricots, dried  4 pieces 2.9    Banana  1 medium 3.9    Blueberries  1 cup 4.2    Cantaloupe, cubes  1 cup 1.3    Figs, dried  2 medium 3.7    Grapefruit  1/2 medium 3.1    Orange, navel  1 medium 3.4    Peach  1 medium 2.0    Peaches, dried  3 pieces 3.2    Pear  1 medium 5.1    Plum  1 medium 1.1    Raisins  1.5 oz box 1.6    Raspberries  1 cup 6.4    Strawberries  1 cup 4.4      Grains, Beans, Nuts & Seeds  Serving Size Fiber (g)    Almonds  1 oz 4.2    Black beans, cooked  1 cup 13.9    Bran cereal  1 cup 19.9    Bread, whole wheat  1 slice 2.0    Brown rice, dry  1 cup 7.9    Cashews  1 oz 1.0    Flax seeds  3 Tbsp. 6.9    Garbanzo beans, cooked  1 cup 5.8    Kidney beans, cooked  1 cup 11.6    Lentils, red cooked  1 cup 13.6    Lima beans, cooked  1 cup 8.6    Oats, rolled dry  1 cup 12.0    Quinoa (seeds) dry  1/4 cup 6.2    Quinoa, cooked  1 cup 8.4    Pasta, whole wheat  1 cup 6.3    Peanuts  1 oz 2.3    Pistachio nuts  1 oz 3.1    Pumpkin seeds  1/4 cup 4.1    Soybeans, cooked  1 cup 8.6    Sunflower seeds  1/4 cup 3.0    Walnuts  1 oz 3.1            Vegetables  Serving Size Fiber (g)    Avocado (fruit)  1 medium 11.8    Beets, cooked  1 cup 2.8    Beet greens  1 cup 4.2    Bok christian, cooked  1 cup 2.8    Broccoli, cooked  1 cup 4.5    [place] sprouts, cooked  1 cup 3.6    Cabbage, cooked  1 cup 4.2    Carrot  1 medium 2.6    Carrot, cooked  1 cup 5.2    Cauliflower, cooked  1 cup 3.4    Justin slaw  1 cup 4.0    Rogelio greens, cooked  1 cup 2.6    Corn, sweet  1 cup 4.6    Green beans  1 cup 4.0    Celery  1 stalk 1.1    Kale, cooked  1 cup 7.2    Onions, raw  1 cup 2.9    Peas, cooked  1 cup 8.8    Peppers, sweet  1 cup 2.6    Pop corn, air-popped  3 cups 3.6    Potato, baked w/ skin  1 medium  4.8    Spinach, cooked  1 cup 4.3    Summer squash, cooked  1 cup 2.5    Sweet potato, cooked  1 medium 4.9    Swiss chard, cooked  1 cup 3.7    Tomato  1 medium 1.0    Winter squash, cooked  1 cup 6.2    Zucchini, cooked  1 cup 2.6

## 2018-05-07 NOTE — PROGRESS NOTES
: 1955    Chief Complaint   Patient presents with   • Abdominal Pain       Terell Rojas is a 63 y.o. male who presents to the office today as a follow up appointment regarding Abdominal Pain and recent procedures.     History of Present Illness:   Previously, he had complaints of nausea, vomiting, abdominal bloating and pain that has been occurring for the past 1 year.  He does admit to having constipation and has taken Linzess 290 mcg daily without relief. He did not receive Relistor previously sent. He explains that he was told ten years ago by Dr. Vega that he has cirrhosis of the liver but he is unsure.  (Liver normal on recent CT.) Patient reports that he drinks beer almost daily. He is now taking Prilosec 20 mg BID without very good control of acid reflux symptoms which he has daily. No previous known diagnosis of Johns's esophagus. After his last office visit, CT abd/pelv was ordered and completed. There was finding of a lung nodule and pulmonology consult was requested but patient declined. He again declines further monitoring of lung nodule. He states that if it is cancer, he does not want to know and does not want treatment. It is noted that he has history of TB which has been monitored by [facility] in the past with CXR.      EGD and colonoscopy was completed by Dr. Brady on 18 which revealed:  -Normal EGD  -Sigmoid polyp, removed  Bowel preparation was fair but adequate.  The colon was quite tortuous and the examination was difficult to perform.      Previous EGD/colonoscopy was 2-3 years ago by Dr. Rodrigues.  Polyps were removed, but the entire colon could not be visualized due to inadequate bowel prep.     Pathology:      CT abd/pelv 3/28/2018:  IMPRESSION:  There is a solid lung nodule in the right lung only  partially imaged on the CT of the abdomen. Further evaluation is  suggested. A source of the patient's abdominal pain and vomiting was not  demonstrated. No inflammatory  changes were demonstratable in the bowel.  There was no evidence of bowel obstruction.     Review of Systems   Constitutional: Positive for fatigue.   HENT: Negative for trouble swallowing.    Respiratory: Positive for shortness of breath.    Cardiovascular: Negative for chest pain.   Gastrointestinal: Positive for abdominal distention, abdominal pain and nausea. Negative for anal bleeding, blood in stool, constipation, diarrhea and vomiting.   Neurological: Positive for dizziness and headaches.   Hematological: Bruises/bleeds easily.       Past Medical History:   Diagnosis Date   • COPD (chronic obstructive pulmonary disease) 11/15/2016   • Hypertension 11/15/2016   • Narrow complex tachycardia 11/15/2016    Likely SVT   • Syncope    • Tobacco use     Chronic       Past Surgical History:   Procedure Laterality Date   • CARDIAC CATHETERIZATION  2008    Normal coronaries   • COLONOSCOPY N/A 4/13/2018    Procedure: COLONOSCOPY  CPTCODE:48875;  Surgeon: Maverick Brady III, MD;  Location: [facility];  Service: Gastroenterology   • ENDOSCOPY N/A 4/13/2018    Procedure: ESOPHAGOGASTRODUODENOSCOPY WITH BIOPSY  CPTCODE:24138;  Surgeon: Maverick Brady III, MD;  Location: [facility];  Service: Gastroenterology   • ENDOSCOPY AND COLONOSCOPY     • TOTAL HIP ARTHROPLASTY REVISION         Family History   Problem Relation Age of Onset   • Coronary artery disease Other    • Diabetes Other    • Heart disease Other    • Hypertension Other    • Liver cancer Mother        Social History     Social History   • Marital status: Single     Social History Main Topics   • Smoking status: Current Every Day Smoker     Packs/day: 3.00     Years: 40.00     Types: Cigarettes   • Smokeless tobacco: Never Used   • Alcohol use 3.6 oz/week     6 Cans of beer per week      Comment: states, sometimes 6 pk per day   • Drug use: No   • Sexual activity: Defer     Other Topics Concern   • Not on file       Current Outpatient Prescriptions:   •   albuterol (PROVENTIL) (2.5 MG/3ML) 0.083% nebulizer solution, prn, Disp: , Rfl: 0  •  amLODIPine (NORVASC) 10 MG tablet, Daily., Disp: , Rfl: 2  •  benzonatate (TESSALON) 100 MG capsule, prn, Disp: , Rfl: 0  •  busPIRone (BUSPAR) 5 MG tablet, 3 (Three) Times a Day., Disp: , Rfl: 2  •  cetirizine (zyrTEC) 10 MG tablet, Daily., Disp: , Rfl: 2  •  CloNIDine (CATAPRES) 0.3 MG tablet, Daily., Disp: , Rfl: 0  •  cyproheptadine (PERIACTIN) 4 MG tablet, Take 4 mg by mouth 3 (Three) Times a Day As Needed for Allergies., Disp: , Rfl:   •  fluticasone (FLONASE) 50 MCG/ACT nasal spray, Daily., Disp: , Rfl: 2  •  gabapentin (NEURONTIN) 300 MG capsule, 3 (Three) Times a Day., Disp: , Rfl: 2  •  GNP VITAMIN D3 EXTRA STRENGTH 1000 UNITS tablet, Daily., Disp: , Rfl: 2  •  hydrochlorothiazide (HYDRODIURIL) 25 MG tablet, Daily., Disp: , Rfl: 2  •  hydrOXYzine (ATARAX) 25 MG tablet, Daily., Disp: , Rfl: 0  •  LINZESS 290 MCG capsule capsule, Daily., Disp: , Rfl: 0  •  losartan (COZAAR) 100 MG tablet, Daily., Disp: , Rfl: 2  •  meclizine (ANTIVERT) 25 MG tablet, 3 (Three) Times a Day., Disp: , Rfl: 2  •  metoprolol succinate XL (TOPROL-XL) 50 MG 24 hr tablet, Daily., Disp: , Rfl: 2  •  mirtazapine (REMERON) 15 MG tablet, Every Night., Disp: , Rfl: 2  •  montelukast (SINGULAIR) 10 MG tablet, Every Night., Disp: , Rfl: 2  •  naproxen (NAPROSYN) 500 MG tablet, prn, Disp: , Rfl: 2  •  omeprazole (priLOSEC) 20 MG capsule, Take 20 mg by mouth 2 (Two) Times a Day., Disp: , Rfl:   •  PAIN RELIEF EXTRA STRENGTH 500 MG tablet, prn, Disp: , Rfl: 0  •  polyethylene glycol (GoLYTELY) 236 g solution, Starting at 6 pm on day prior to procedure, drink 8 ounces every 15 minutes until all gone or stools are clear. May add flavor packet., Disp: 4000 mL, Rfl: 0  •  potassium chloride (K-DUR,KLOR-CON) 20 MEQ CR tablet, Takes 1 daily, d/c after james., Disp: , Rfl: 0  •  sucralfate (CARAFATE) 1 g tablet, Take 1 g by mouth 4 (Four) Times a Day., Disp: , Rfl:  "  •  SYMBICORT 80-4.5 MCG/ACT inhaler, 2 puffs 2 (Two) Times a Day., Disp: , Rfl: 2  •  VENTOLIN  (90 BASE) MCG/ACT inhaler, prn, Disp: , Rfl: 2    Allergies:   Patient has no known allergies.    Vitals:  /79   Pulse 94   Ht 161.3 cm (63.5\")   Wt 46.7 kg (103 lb)   SpO2 97%   BMI 17.96 kg/m²     Physical Exam   Constitutional: He is oriented to person, place, and time. He appears well-developed and well-nourished. No distress.   HENT:   Head: Normocephalic and atraumatic.   Nose: Nose normal.   Mouth/Throat: Oropharynx is clear and moist.   Eyes: Conjunctivae are normal. Right eye exhibits no discharge. Left eye exhibits no discharge. No scleral icterus.   Neck: Normal range of motion. No JVD present.   Cardiovascular: Normal rate, regular rhythm and normal heart sounds.  Exam reveals no gallop and no friction rub.    No murmur heard.  Pulmonary/Chest: Effort normal. No respiratory distress. He has wheezes. He has no rales. He exhibits no tenderness.   Abdominal: Soft. Bowel sounds are normal. He exhibits no mass. There is tenderness (mild, generalized).   Musculoskeletal: Normal range of motion. He exhibits no edema or deformity.   Neurological: He is alert and oriented to person, place, and time. Coordination normal.   Skin: Skin is warm and dry. No rash noted. He is not diaphoretic. No erythema.   Psychiatric: He has a normal mood and affect. His behavior is normal. Judgment and thought content normal.   Vitals reviewed.      Assessment/Plan:  1. Gastroesophageal reflux disease with esophagitis    2. Constipation, unspecified constipation type      His results were discussed in detail. He will have repeat EGD in 2 years to confirm diagnosis of Johns's esophagus which was reported on pathology recently depending on relationship to the Z line. He was instructed not to lie down immediately after eating (wait at least 3 hours after meals), elevate the head of the bed at night, avoid spicy foods, " avoid mints, avoid caffeine, avoid nicotine and maintain a healthy weight. He will start taking omeprazole 40 mg twice daily 30 minutes before meals.     For constipation, he has requested that he try Miralax 17 g once daily for control. With findings of redundant colon on recent procedure, I think he will need an oral medication for his constipation. He has agreed to increase water and fiber intake. A list of fiber foods was given with daily goal of 25 g.     New Medications Ordered This Visit   Medications   • polyethylene glycol (MIRALAX) powder     Sig: Take 17 g by mouth Daily.     Dispense:  510 g     Refill:  2   • omeprazole (priLOSEC) 40 MG capsule     Sig: Take 1 capsule by mouth 2 (Two) Times a Day Before Meals.     Dispense:  60 capsule     Refill:  5           Return in about 1 month (around 6/7/2018) for recheck abdominal pain.      Electronically signed 5/30/2018 4:30 PM  Leanne Irwin PA-C, [facility]

## 2023-05-18 NOTE — ED NOTES
Pt states he had an EGD and Colonoscopy today at [facility], then when he got home he had a BM that had some dark red blood when he wiped his bottom, pt denies any other  Bleeding from his rectum     Joanne Larsen RN  04/13/18 [id number]     No

## (undated) DEVICE — SYR LUERLOK 30CC

## (undated) DEVICE — SINGLE PORT MANIFOLD: Brand: NEPTUNE 2

## (undated) DEVICE — GOWN,REINF,POLY,ECL,PP SLV,XL: Brand: MEDLINE

## (undated) DEVICE — Device: Brand: DEFENDO AIR/WATER/SUCTION AND BIOPSY VALVE

## (undated) DEVICE — SNAR POLYP CAPTIFLX MICRO OVL 13MM 240CM

## (undated) DEVICE — THE SINGLE USE ETRAP – POLYP TRAP IS USED FOR SUCTION RETRIEVAL OF ENDOSCOPICALLY REMOVED POLYPS.: Brand: ETRAP

## (undated) DEVICE — Device

## (undated) DEVICE — TUBING, SUCTION, 1/4" X 20', STRAIGHT: Brand: MEDLINE INDUSTRIES, INC.

## (undated) DEVICE — Device: Brand: ENDOGATOR

## (undated) DEVICE — CONN Y IRR DISP 1P/U

## (undated) DEVICE — THE BITE BLOCK MAXI, LATEX FREE STRAP IS USED TO PROTECT THE ENDOSCOPE INSERTION TUBE FROM BEING BITTEN BY THE PATIENT.

## (undated) DEVICE — FRCP BX RADJAW4 NDL 2.8 240CM LG OG BX40